# Patient Record
Sex: MALE | Race: BLACK OR AFRICAN AMERICAN | NOT HISPANIC OR LATINO | URBAN - METROPOLITAN AREA
[De-identification: names, ages, dates, MRNs, and addresses within clinical notes are randomized per-mention and may not be internally consistent; named-entity substitution may affect disease eponyms.]

---

## 2018-06-26 ENCOUNTER — EMERGENCY (EMERGENCY)
Facility: HOSPITAL | Age: 38
LOS: 1 days | Discharge: HOME | End: 2018-06-26
Attending: EMERGENCY MEDICINE
Payer: MEDICARE

## 2018-06-26 VITALS
RESPIRATION RATE: 20 BRPM | DIASTOLIC BLOOD PRESSURE: 71 MMHG | HEART RATE: 80 BPM | OXYGEN SATURATION: 99 % | SYSTOLIC BLOOD PRESSURE: 159 MMHG | TEMPERATURE: 98 F | HEIGHT: 68 IN | WEIGHT: 229.94 LBS

## 2018-06-26 DIAGNOSIS — R69 ILLNESS, UNSPECIFIED: ICD-10-CM

## 2018-06-26 DIAGNOSIS — R41.82 ALTERED MENTAL STATUS, UNSPECIFIED: ICD-10-CM

## 2018-06-26 LAB
AMPHET UR-MCNC: NEGATIVE — SIGNIFICANT CHANGE UP
ANION GAP SERPL CALC-SCNC: 19 MMOL/L — HIGH (ref 5–17)
APAP SERPL-MCNC: <7.5 UG/ML — LOW (ref 10–26)
APPEARANCE UR: CLEAR — SIGNIFICANT CHANGE UP
BARBITURATES UR SCN-MCNC: NEGATIVE — SIGNIFICANT CHANGE UP
BENZODIAZ UR-MCNC: NEGATIVE — SIGNIFICANT CHANGE UP
BILIRUB UR-MCNC: NEGATIVE — SIGNIFICANT CHANGE UP
BUN SERPL-MCNC: 9 MG/DL — SIGNIFICANT CHANGE UP (ref 8–20)
CALCIUM SERPL-MCNC: 9.4 MG/DL — SIGNIFICANT CHANGE UP (ref 8.6–10.2)
CHLORIDE SERPL-SCNC: 99 MMOL/L — SIGNIFICANT CHANGE UP (ref 98–107)
CO2 SERPL-SCNC: 22 MMOL/L — SIGNIFICANT CHANGE UP (ref 22–29)
COCAINE METAB.OTHER UR-MCNC: NEGATIVE — SIGNIFICANT CHANGE UP
COLOR SPEC: YELLOW — SIGNIFICANT CHANGE UP
CREAT SERPL-MCNC: 0.94 MG/DL — SIGNIFICANT CHANGE UP (ref 0.5–1.3)
DIFF PNL FLD: NEGATIVE — SIGNIFICANT CHANGE UP
ETHANOL SERPL-MCNC: <10 MG/DL — SIGNIFICANT CHANGE UP
GLUCOSE SERPL-MCNC: 100 MG/DL — SIGNIFICANT CHANGE UP (ref 70–115)
GLUCOSE UR QL: NEGATIVE MG/DL — SIGNIFICANT CHANGE UP
HCT VFR BLD CALC: 42 % — SIGNIFICANT CHANGE UP (ref 42–52)
HGB BLD-MCNC: 14.7 G/DL — SIGNIFICANT CHANGE UP (ref 14–18)
KETONES UR-MCNC: NEGATIVE — SIGNIFICANT CHANGE UP
LEUKOCYTE ESTERASE UR-ACNC: NEGATIVE — SIGNIFICANT CHANGE UP
MCHC RBC-ENTMCNC: 30.4 PG — SIGNIFICANT CHANGE UP (ref 27–31)
MCHC RBC-ENTMCNC: 35 G/DL — SIGNIFICANT CHANGE UP (ref 32–36)
MCV RBC AUTO: 87 FL — SIGNIFICANT CHANGE UP (ref 80–94)
METHADONE UR-MCNC: NEGATIVE — SIGNIFICANT CHANGE UP
NEUTROPHILS NFR BLD AUTO: SIGNIFICANT CHANGE UP % (ref 37–73)
NITRITE UR-MCNC: NEGATIVE — SIGNIFICANT CHANGE UP
OPIATES UR-MCNC: NEGATIVE — SIGNIFICANT CHANGE UP
PCP SPEC-MCNC: SIGNIFICANT CHANGE UP
PCP UR-MCNC: NEGATIVE — SIGNIFICANT CHANGE UP
PH UR: 6.5 — SIGNIFICANT CHANGE UP (ref 5–8)
PLATELET # BLD AUTO: 362 K/UL — SIGNIFICANT CHANGE UP (ref 150–400)
POTASSIUM SERPL-MCNC: 3.7 MMOL/L — SIGNIFICANT CHANGE UP (ref 3.5–5.3)
POTASSIUM SERPL-SCNC: 3.7 MMOL/L — SIGNIFICANT CHANGE UP (ref 3.5–5.3)
PROT UR-MCNC: NEGATIVE MG/DL — SIGNIFICANT CHANGE UP
RBC # BLD: 4.83 M/UL — SIGNIFICANT CHANGE UP (ref 4.6–6.2)
RBC # FLD: 13.5 % — SIGNIFICANT CHANGE UP (ref 11–15.6)
SALICYLATES SERPL-MCNC: <0.6 MG/DL — LOW (ref 10–20)
SODIUM SERPL-SCNC: 140 MMOL/L — SIGNIFICANT CHANGE UP (ref 135–145)
SP GR SPEC: 1.01 — SIGNIFICANT CHANGE UP (ref 1.01–1.02)
THC UR QL: NEGATIVE — SIGNIFICANT CHANGE UP
TSH SERPL-MCNC: 0.56 UIU/ML — SIGNIFICANT CHANGE UP (ref 0.27–4.2)
UROBILINOGEN FLD QL: NEGATIVE MG/DL — SIGNIFICANT CHANGE UP
WBC # BLD: 8.1 K/UL — SIGNIFICANT CHANGE UP (ref 4.8–10.8)
WBC # FLD AUTO: 8.1 K/UL — SIGNIFICANT CHANGE UP (ref 4.8–10.8)

## 2018-06-26 PROCEDURE — 99285 EMERGENCY DEPT VISIT HI MDM: CPT

## 2018-06-26 PROCEDURE — 93005 ELECTROCARDIOGRAM TRACING: CPT

## 2018-06-26 PROCEDURE — 80307 DRUG TEST PRSMV CHEM ANLYZR: CPT

## 2018-06-26 PROCEDURE — 36415 COLL VENOUS BLD VENIPUNCTURE: CPT

## 2018-06-26 PROCEDURE — 84443 ASSAY THYROID STIM HORMONE: CPT

## 2018-06-26 PROCEDURE — 99284 EMERGENCY DEPT VISIT MOD MDM: CPT

## 2018-06-26 PROCEDURE — 80048 BASIC METABOLIC PNL TOTAL CA: CPT

## 2018-06-26 PROCEDURE — 85027 COMPLETE CBC AUTOMATED: CPT

## 2018-06-26 PROCEDURE — 93010 ELECTROCARDIOGRAM REPORT: CPT

## 2018-06-26 PROCEDURE — 81003 URINALYSIS AUTO W/O SCOPE: CPT

## 2018-06-26 PROCEDURE — 82962 GLUCOSE BLOOD TEST: CPT

## 2018-06-26 RX ORDER — DIPHENHYDRAMINE HCL 50 MG
50 CAPSULE ORAL EVERY 6 HOURS
Qty: 0 | Refills: 0 | Status: DISCONTINUED | OUTPATIENT
Start: 2018-06-26 | End: 2018-07-01

## 2018-06-26 RX ORDER — HALOPERIDOL DECANOATE 100 MG/ML
5 INJECTION INTRAMUSCULAR EVERY 6 HOURS
Qty: 0 | Refills: 0 | Status: DISCONTINUED | OUTPATIENT
Start: 2018-06-26 | End: 2018-07-01
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
## 2018-06-26 NOTE — ED ADULT NURSE REASSESSMENT NOTE - NS ED NURSE REASSESS COMMENT FT1
Pt currently sitting in the common room area, in no apparent discomfort. Pt asked if anyone got hurt before while he was in a Catatonic state. Pt informed that no one got hurt. Pt denies hx of violence. Pt states he doesn't remember what happened. As per pt, "Maybe I was in heaven talking to god while that was happening". Pt reports he lives at the resident [place] where the  picked him up at. Pt denies living or in [place]. Pt denies any prior psychiatric hx. No other pt complaints at this time. Will continue to monitor the pt and maintain safety. Pt currently sitting in the common room area, in no apparent discomfort. Pt asked if anyone got hurt before while he was in a Catatonic state. Pt informed that no one got hurt. Pt denies hx of violence. Pt states he doesn't remember what happened. As per pt, "Maybe I was in heaven talking to god while that was happening". Pt reports he lives at the resident In [place] where the  picked him up at. Pt denies living in [place]. Pt denies any prior psychiatric hx. No other pt complaints at this time. Will continue to monitor the pt and maintain safety.

## 2018-06-26 NOTE — ED BEHAVIORAL HEALTH ASSESSMENT NOTE - RISK ASSESSMENT
low risk for suicidality or homicidality low risk for suicidality or homicidality Unable to care for self due top delusional beliefs

## 2018-06-26 NOTE — ED ADULT NURSE REASSESSMENT NOTE - NS ED NURSE REASSESS COMMENT FT1
Assumed care of pt @1930. Pt was resting/sleeping comfortably in bed then woke up, ambulated steadily to the bathroom. Pt then started to walk around the unit in a catatonic state, not responding to his name, staring blankly, not talking or answering any questions. Pt began to  front of the man trap doors. Pt unable to be redirected away from doors. Psych MD tried to come into the unit, pt then tried to elope from unit. Pt then threw himself onto the floor, not talking or answering any questions. Security called to get pt away from the man trap doors. Pt currently resting, continuing to stare blankly, not talking or answering any questions. Vitals signs stable. No harm to self or others. Safety maintained. Will continue to monitor the pt and maintain safety. Assumed care of pt @1930. Pt was resting/sleeping comfortably in bed then woke up, ambulated steadily to the bathroom. Pt then started to walk around the unit in a catatonic state, not responding to his name, staring blankly, not talking or answering any questions. Pt began to  front of the man trap doors. Pt unable to be redirected away from doors. Psych MD tried to come into the unit, pt then tried to elope from unit. Pt then threw himself onto the floor, not talking or answering any questions. Security called to get pt away from the man trap doors. Pt currently resting in his room, continuing to stare blankly, not talking or answering any questions. Pt appears anxious. Vitals signs stable. No harm to self or others. Safety maintained. Will continue to monitor the pt and maintain safety.

## 2018-06-26 NOTE — ED ADULT NURSE REASSESSMENT NOTE - NS ED NURSE REASSESS COMMENT FT1
Patient arrived to  unit at this time. Patient changed into yellow gown and belongings secured. Patients IV removed. Patient is calm and cooperative at this time. Patient placed in room 2 and will be seen by MD.

## 2018-06-26 NOTE — ED ADULT TRIAGE NOTE - CHIEF COMPLAINT QUOTE
Patient arrived to ED today BIBA after being found on someone's front lawn some what alerted.  Patient A&O x4 at this time, patient states he was brought here for evaluation.  Dr. Harper at bedside to evaluation.
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
## 2018-06-26 NOTE — ED PROVIDER NOTE - CARE PLAN
Principal Discharge DX:	Abnormal behavior Principal Discharge DX:	Psychosis, unspecified psychosis type

## 2018-06-26 NOTE — ED BEHAVIORAL HEALTH ASSESSMENT NOTE - PRIMARY DX
Altered mental status, unspecified altered mental status type Deferred condition on axis II Psychosis, unspecified psychosis type

## 2018-06-26 NOTE — ED ADULT NURSE NOTE - OBJECTIVE STATEMENT
pt a&ox3 states he was sitting on his front steps eating chips when someone called 911. pt denies any pain/discomfort. moves all extremities x4, no numbness tingling, pupils reactive. follows all commands.  pt denies smoking/drinking/drug use. denies any pmh or taking any medication.

## 2018-06-26 NOTE — ED ADULT NURSE NOTE - CHPI ED SYMPTOMS NEG
no change in level of consciousness/no weakness/no blurred vision/no fever/no dizziness/no nausea/no numbness/no confusion/no vomiting/no loss of consciousness

## 2018-06-26 NOTE — ED ADULT NURSE REASSESSMENT NOTE - NS ED NURSE REASSESS COMMENT FT1
[facility] Reportedly patient was sitting on porch eating snacks and having a beverage on the front porch of home he stated he lived in. Residents of the house had called the police to report he was trespassing and did not, in fact, live there. He was polite during intake interview with psychiatrist, but did not/will not provide further information about where he lived or names of friends/relatives whom might provide collateral. His ID and contents of wallet indicated he was from [place], and had traveled here within the past week. Provided urine specimen as requested, and rested in bed after having dinner. Denied any prior psychiatric treatment or medical problems. Denied etoh/substance use/abuse. Also denied any thoughts to harm self/others. Continuing to monitor and reassess to determine appropriate disposition.

## 2018-06-26 NOTE — ED BEHAVIORAL HEALTH ASSESSMENT NOTE - SUMMARY
Patient is a 38 y/o male with no PMH brought in by police for apparently being in someone else's yard acting bizarre.    waiting for urine drug toxicology Patient is a 38 y/o male with no PMH brought in by police for apparently being in someone else's yard acting bizarre.

## 2018-06-26 NOTE — ED PROVIDER NOTE - PROGRESS NOTE DETAILS
was able to find that pt recently travelled from [place] in the last 2 days. Does not appear to be local to this area. Suspect psychiatric diagnosis. Suspect will be placed inpatient. Lab lost urine sample, being resent. Signed out to Yusef Alston pending  recommendations. TRANSFER [facility] . HAS BED. PSYCHOSIS

## 2018-06-26 NOTE — ED BEHAVIORAL HEALTH ASSESSMENT NOTE - DESCRIPTION
cooperative  Vital Signs Last 24 Hrs  T(C): 36.2 (26 Jun 2018 11:06), Max: 36.4 (26 Jun 2018 10:34)  T(F): 97.2 (26 Jun 2018 11:06), Max: 97.5 (26 Jun 2018 10:34)  HR: 77 (26 Jun 2018 11:06) (77 - 80)  BP: 148/70 (26 Jun 2018 11:06) (148/70 - 159/71)  RR: 20 (26 Jun 2018 11:06) (20 - 20)  SpO2: 100% (26 Jun 2018 11:06) (99% - 100%) none lives alone in a house

## 2018-06-26 NOTE — ED BEHAVIORAL HEALTH NOTE - BEHAVIORAL HEALTH NOTE
patient attempted to escape earlier, was wandering around unit, not talking. will order PRN medication

## 2018-06-26 NOTE — ED BEHAVIORAL HEALTH ASSESSMENT NOTE - HPI (INCLUDE ILLNESS QUALITY, SEVERITY, DURATION, TIMING, CONTEXT, MODIFYING FACTORS, ASSOCIATED SIGNS AND SYMPTOMS)
Patient is a 38 y/o male with no PMH brought in by police for apparently being in someone else's yard acting bizarre. Upon questioning patient he states he was eating munchies and having a cola in his own yard when someone called the police. Could not really answer any of the questions fully. Denies substance use, SI/HI/ hallucinations, hearing voices. Patient is a 38 y/o male with no PMH brought in by police for apparently being in someone else's yard acting bizarre. Upon questioning patient he states he was eating munchies and having a cola in his own yard when someone called the police. Could not really answer any of the questions fully. Denies substance use, SI/HI/ hallucinations, hearing voices.  Patient's photo ID is from [place]  [place]  NO collateral available

## 2018-06-26 NOTE — ED PROVIDER NOTE - PHYSICAL EXAMINATION
GEN: NAD, A & O x 4  HEAD/EYES: NCAT, PERRL, EOMI, anicteric sclerae, no conjunctival pallor  ENT: mucus membranes moist, oropharynx WNL, trachea midline, no JVD  RESP: lungs CTA with equal breath sounds bilaterally, chest wall nontender and atraumatic  CV: heart with reg rhythm S1, S2, no murmur; distal pulses intact and symmetric bilaterally  ABDOMEN: normoactive bowel sounds, soft, nondistended, nontender, no palpable masses  : no CVAT  MSK: extremities atraumatic and nontender, no edema, no asymmetry. the back is without midline or lateral tenderness, there is no spinal deformity or stepoff and the back is ranged painlessly. the neck has no midline tenderness, deformity, or stepoff, and is ranged painlessly.  SKIN: warm, dry, no rash, no bruising, no cyanosis. color appropriate for ethnicity, no track marks  NEURO: Normal mentation, GCS15, CNII-XII are intact, strength is 5/5 throughout upper and lower extremities symmetric bilaterally, Sensation is intact to light touch throughout trunk and extremities symmetric bilaterally, Cerebellar function is intact by finger-nose-finger, no hyperreflexia, Babinski is downgoing bilaterally, Normal Gait, no clonus, no rigidity  PSYCH: flat affect, no labile emotional response, no psychomotor agitation, thought formation abnormal (cannot complete common expressions normally)

## 2018-06-27 VITALS
RESPIRATION RATE: 16 BRPM | HEART RATE: 66 BPM | OXYGEN SATURATION: 98 % | TEMPERATURE: 98 F | DIASTOLIC BLOOD PRESSURE: 74 MMHG | SYSTOLIC BLOOD PRESSURE: 119 MMHG

## 2018-06-27 DIAGNOSIS — F29 UNSPECIFIED PSYCHOSIS NOT DUE TO A SUBSTANCE OR KNOWN PHYSIOLOGICAL CONDITION: ICD-10-CM

## 2018-06-27 PROCEDURE — 99211 OFF/OP EST MAY X REQ PHY/QHP: CPT

## 2018-06-27 PROCEDURE — 99213 OFFICE O/P EST LOW 20 MIN: CPT

## 2018-06-27 NOTE — ED ADULT NURSE REASSESSMENT NOTE - NS ED NURSE REASSESS COMMENT FT1
Pt currently resting/sleeping comfortably on stretcher, in no acute distress at this time. Respirations even & unlabored. VSS. No harm to self or others. Safety maintained. Will continue to monitor the pt.

## 2018-06-27 NOTE — ED ADULT NURSE REASSESSMENT NOTE - COMFORT CARE
warm blanket provided/repositioned/darkened lights
ambulated to bathroom/repositioned/warm blanket provided
darkened lights/ambulated to bathroom/meal provided/plan of care explained/warm blanket provided/po fluids offered

## 2018-06-27 NOTE — ED ADULT NURSE REASSESSMENT NOTE - GENERAL PATIENT STATE
comfortable appearance/cooperative
comfortable appearance/resting/sleeping/cooperative
anxious
comfortable appearance/cooperative

## 2018-06-27 NOTE — ED BEHAVIORAL HEALTH NOTE - BEHAVIORAL HEALTH NOTE
SW Note: Pt has been accepted to [facility] for inpt psychiatric care. Accepting MD, Dr. Cosme. 9.37 legals completed. Pt aware of plan. Pt was polite and calm, just saying "OK". Asked again about where he lives and pt repeats his statement that he lives here in [place]. In pts wallet he has a drivers license from [place] and receipts showing he traveled from [place] to [place] on 6/20/18. Pt gives no info/explanation about this.

## 2018-06-27 NOTE — PROGRESS NOTE ADULT - SUBJECTIVE AND OBJECTIVE BOX
bizarre behaviors observed over night At times electively mute, then excited and attempting to elope  Continues to express odd delusions and is thought disordered  photo ID is from [place] yet address in [place] is [address]  Vital Signs Last 24 Hrs  T(C): 36.6 (2018 02:49), Max: 36.9 (2018 19:00)  T(F): 97.9 (2018 02:49), Max: 98.4 (2018 19:00)  HR: 77 (2018 02:49) (77 - 88)  BP: 110/75 (2018 02:49) (110/75 - 159/71)  BP(mean): --  RR: 17 (2018 02:49) (17 - 20)  SpO2: 98% (2018 02:49) (98% - 100%)  drug screen negative  ROS: negative                        14.7   8.1   )-----------( 362      ( 2018 13:20 )             42.0     06-26    140  |  99  |  9.0  ----------------------------<  100  3.7   |  22.0  |  0.94    Ca    9.4      2018 13:20          Urinalysis Basic - ( 2018 18:08 )    Color: Yellow / Appearance: Clear / S.010 / pH: x  Gluc: x / Ketone: Negative  / Bili: Negative / Urobili: Negative mg/dL   Blood: x / Protein: Negative mg/dL / Nitrite: Negative   Leuk Esterase: Negative / RBC: x / WBC x   Sq Epi: x / Non Sq Epi: x / Bacteria: x

## 2022-02-02 NOTE — PROGRESS NOTE ADULT - PROBLEM SELECTOR PROBLEM 1
Impression: Diagnosis: Primary open-angle glaucoma, bilateral, moderate stage. Code: P34.5751. OU. IOP good today. Preformed and reviewed VF and RNFL OCT, normal OU. Plan: Discussed with patient. Continue to monitor. Continue Lumigan QHS OU, Timolol BID OU.
Impression: Nonexudative age-related macular degeneration, bilateral, early dry stage: H35.3131. Bilateral. Plan: Discussed diagnosis in detail with patient. Counseling given about the benefits and/or risks of the Age-Related Eye Disease Study (AREDS) formulation for preventing progression of age-related macular degeneration (AMD). Add lutein 20-30 mg, zeaxanthin 2-5mg per day with MV or AREDS 2 MV PO daily as directed. Will continue to observe condition and or symptoms. Call if [address] worsens. Dispensed and explained use of Amsler grid.
Impression: Presence of intraocular lens: Z96.1. Bilateral. Plan: Observe for now.
Psychosis, unspecified psychosis type
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
